# Patient Record
Sex: MALE | Race: WHITE | Employment: FULL TIME | URBAN - NONMETROPOLITAN AREA
[De-identification: names, ages, dates, MRNs, and addresses within clinical notes are randomized per-mention and may not be internally consistent; named-entity substitution may affect disease eponyms.]

---

## 2018-08-27 ENCOUNTER — HOSPITAL ENCOUNTER (EMERGENCY)
Age: 28
Discharge: HOME | End: 2018-08-27
Attending: EMERGENCY MEDICINE

## 2018-08-27 VITALS
HEART RATE: 85 BPM | RESPIRATION RATE: 20 BRPM | BODY MASS INDEX: 27.49 KG/M2 | WEIGHT: 165 LBS | SYSTOLIC BLOOD PRESSURE: 132 MMHG | TEMPERATURE: 97.2 F | HEIGHT: 65 IN | OXYGEN SATURATION: 100 % | DIASTOLIC BLOOD PRESSURE: 72 MMHG
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
## 2018-08-27 PROCEDURE — 6370000000 HC RX 637 (ALT 250 FOR IP): Performed by: PHYSICIAN ASSISTANT

## 2018-08-27 PROCEDURE — 99283 EMERGENCY DEPT VISIT LOW MDM: CPT

## 2018-08-27 RX ORDER — AMOXICILLIN AND CLAVULANATE POTASSIUM 875; 125 MG/1; MG/1
1 TABLET, FILM COATED ORAL ONCE
Status: COMPLETED | OUTPATIENT
Start: 2018-08-27 | End: 2018-08-27

## 2018-08-27 RX ORDER — AMOXICILLIN AND CLAVULANATE POTASSIUM 875; 125 MG/1; MG/1
1 TABLET, FILM COATED ORAL 2 TIMES DAILY
Qty: 20 TABLET | Refills: 0 | Status: SHIPPED | OUTPATIENT
Start: 2018-08-27 | End: 2018-09-06

## 2018-08-27 RX ORDER — IBUPROFEN 800 MG/1
800 TABLET ORAL EVERY 8 HOURS PRN
Qty: 20 TABLET | Refills: 0 | Status: SHIPPED | OUTPATIENT
Start: 2018-08-27

## 2018-08-27 RX ADMIN — AMOXICILLIN AND CLAVULANATE POTASSIUM 1 TABLET: 875; 125 TABLET, FILM COATED ORAL at 13:43

## 2018-08-27 ASSESSMENT — PAIN DESCRIPTION - DESCRIPTORS: DESCRIPTORS: ACHING

## 2018-08-27 ASSESSMENT — PAIN DESCRIPTION - ORIENTATION: ORIENTATION: LEFT;LOWER

## 2018-08-27 ASSESSMENT — PAIN SCALES - GENERAL: PAINLEVEL_OUTOF10: 2

## 2018-08-27 ASSESSMENT — PAIN DESCRIPTION - LOCATION: LOCATION: LEG

## 2018-08-27 ASSESSMENT — PAIN DESCRIPTION - PAIN TYPE: TYPE: ACUTE PAIN

## 2018-08-27 NOTE — ED PROVIDER NOTES
Patient had been mowing the grass around a water [place] the gait was open the neighbor's dog had gotten loose he got off the mower to shut the gate and they came around and he got bit at the left lower leg. Dog owner unsure of rabies status of the  and 's office was called and they have taken the dog quarantined for 10 days. Patient's tetanus is up-to-date states he had a Tdap within this past year. Coworker here states the dogs are healthy appearing did not look sickly. Patient has been up and walking, normal range of motion and no numbness or weakness. The history is provided by the patient. Animal Bite   Contact animal:  Dog  Location:  Leg  Leg injury location:  L lower leg  Pain details:     Severity:  Mild  Incident location:  Outside  Provoked: provoked    Notifications:  Law enforcement and animal control  Animal's rabies vaccination status:  Unknown  Animal in possession: yes    Tetanus status:  Up to date  Associated symptoms: no fever and no numbness        Review of Systems   Constitutional: Negative for fever. Skin: Positive for wound. Neurological: Negative for numbness. PAST MEDICAL HISTORY   denies    PAST SURGICAL HISTORY   has no past surgical history on file. FAMILY HISTORY  family history is not on file. SOCIAL HISTORY   reports that he has never smoked. He has never used smokeless tobacco. He reports that he does not drink alcohol or use drugs. HOME MEDICATIONS     Prior to Admission medications    Medication Sig Start Date End Date Taking? Authorizing Provider   amoxicillin-clavulanate (AUGMENTIN) 875-125 MG per tablet Take 1 tablet by mouth 2 times daily for 10 days 8/27/18 9/6/18 Yes Trudee Overcast, PA-C   ibuprofen (IBU) 800 MG tablet Take 1 tablet by mouth every 8 hours as needed for Pain 8/27/18  Yes Trudee Overcast, PA-C        ALLERGIES  has No Known Allergies.      /72   Pulse 85   Temp 97.2 °F (36.2 °C) (Oral)   Resp 20   Ht 5' 5\" (1.651 m)   Wt 165 lb (74.8 kg)   SpO2 100%   BMI 27.46 kg/m²     Physical Exam   Constitutional: He is oriented to person, place, and time. He appears well-developed and well-nourished. Non-toxic appearance. He does not have a sickly appearance. He does not appear ill. HENT:   Head: Normocephalic and atraumatic. Cardiovascular: Intact distal pulses. Pulses:       Dorsalis pedis pulses are 2+ on the right side. Posterior tibial pulses are 2+ on the right side. Pulmonary/Chest: Effort normal.   Musculoskeletal:        Left ankle: He exhibits normal range of motion and normal pulse. No tenderness. Legs:       Left foot: There is normal range of motion, no tenderness, no swelling, normal capillary refill and no crepitus. Neurological: He is alert and oriented to person, place, and time. No sensory deficit. He exhibits normal muscle tone. Skin: Skin is warm and dry. Psychiatric: He has a normal mood and affect. His behavior is normal.   Vitals reviewed. Procedures    MDM  Number of Diagnoses or Management Options  Dog bite, initial encounter:   Patient Progress  Patient progress: stable           1:38 PM  Impression dog bite. Patient has 2 tiny superficial puncture wounds and left lower leg and a small abrasion. Normal range of motion neurovascular intact. Wounds will be cleaned and irrigated in his placed on Augmentin discuss wound care at home. Offered rabies vaccines patient declines the dog will be quarantined for 10 days. Referral to primary care for follow-up for wound check advised return to ER for worsening symptoms or signs of infection. He understands and agrees. I estimate there is LOW risk for FRACTURE, COMPARTMENT SYNDROME, TENDON OR NEUROVASCULAR INJURY, OR FOREIGN BODY, thus I consider the discharge disposition reasonable. Also, there is no evidence or peritonitis, sepsis, or toxicity. Dr. Gemma Parkinson.  Milton Burton MD spent face to face time with patient and agrees with above dx and treatment. Please note that this chart was generated using Dragon dictation software.  Although every effort was made to ensure the accuracy of this automated transcription, some errors in transcription may have occurred       Zhang Major PA-C  08/27/18 1698